# Patient Record
Sex: MALE | Race: BLACK OR AFRICAN AMERICAN | NOT HISPANIC OR LATINO | Employment: FULL TIME | URBAN - METROPOLITAN AREA
[De-identification: names, ages, dates, MRNs, and addresses within clinical notes are randomized per-mention and may not be internally consistent; named-entity substitution may affect disease eponyms.]

---

## 2023-03-15 ENCOUNTER — OFFICE VISIT (OUTPATIENT)
Dept: PODIATRY | Facility: CLINIC | Age: 28
End: 2023-03-15
Payer: COMMERCIAL

## 2023-03-15 VITALS
RESPIRATION RATE: 16 BRPM | BODY MASS INDEX: 26.48 KG/M2 | DIASTOLIC BLOOD PRESSURE: 67 MMHG | WEIGHT: 185 LBS | HEIGHT: 70 IN | SYSTOLIC BLOOD PRESSURE: 123 MMHG | HEART RATE: 67 BPM

## 2023-03-15 DIAGNOSIS — M20.5X2 ACQUIRED ADDUCTOVARUS ROTATION OF TOE OF LEFT FOOT: ICD-10-CM

## 2023-03-15 DIAGNOSIS — L97.521 SKIN ULCER OF TOE OF LEFT FOOT, LIMITED TO BREAKDOWN OF SKIN: Primary | ICD-10-CM

## 2023-03-15 DIAGNOSIS — B35.3 TINEA PEDIS OF LEFT FOOT: ICD-10-CM

## 2023-03-15 PROCEDURE — 3078F PR MOST RECENT DIASTOLIC BLOOD PRESSURE < 80 MM HG: ICD-10-PCS | Mod: CPTII,S$GLB,, | Performed by: PODIATRIST

## 2023-03-15 PROCEDURE — 99202 PR OFFICE/OUTPT VISIT, NEW, LEVL II, 15-29 MIN: ICD-10-PCS | Mod: S$GLB,,, | Performed by: PODIATRIST

## 2023-03-15 PROCEDURE — 1159F PR MEDICATION LIST DOCUMENTED IN MEDICAL RECORD: ICD-10-PCS | Mod: CPTII,S$GLB,, | Performed by: PODIATRIST

## 2023-03-15 PROCEDURE — 99999 PR PBB SHADOW E&M-NEW PATIENT-LVL III: CPT | Mod: PBBFAC,,, | Performed by: PODIATRIST

## 2023-03-15 PROCEDURE — 1160F RVW MEDS BY RX/DR IN RCRD: CPT | Mod: CPTII,S$GLB,, | Performed by: PODIATRIST

## 2023-03-15 PROCEDURE — 3008F PR BODY MASS INDEX (BMI) DOCUMENTED: ICD-10-PCS | Mod: CPTII,S$GLB,, | Performed by: PODIATRIST

## 2023-03-15 PROCEDURE — 1160F PR REVIEW ALL MEDS BY PRESCRIBER/CLIN PHARMACIST DOCUMENTED: ICD-10-PCS | Mod: CPTII,S$GLB,, | Performed by: PODIATRIST

## 2023-03-15 PROCEDURE — 3008F BODY MASS INDEX DOCD: CPT | Mod: CPTII,S$GLB,, | Performed by: PODIATRIST

## 2023-03-15 PROCEDURE — 3078F DIAST BP <80 MM HG: CPT | Mod: CPTII,S$GLB,, | Performed by: PODIATRIST

## 2023-03-15 PROCEDURE — 99999 PR PBB SHADOW E&M-NEW PATIENT-LVL III: ICD-10-PCS | Mod: PBBFAC,,, | Performed by: PODIATRIST

## 2023-03-15 PROCEDURE — 3074F SYST BP LT 130 MM HG: CPT | Mod: CPTII,S$GLB,, | Performed by: PODIATRIST

## 2023-03-15 PROCEDURE — 1159F MED LIST DOCD IN RCRD: CPT | Mod: CPTII,S$GLB,, | Performed by: PODIATRIST

## 2023-03-15 PROCEDURE — 99202 OFFICE O/P NEW SF 15 MIN: CPT | Mod: S$GLB,,, | Performed by: PODIATRIST

## 2023-03-15 PROCEDURE — 3074F PR MOST RECENT SYSTOLIC BLOOD PRESSURE < 130 MM HG: ICD-10-PCS | Mod: CPTII,S$GLB,, | Performed by: PODIATRIST

## 2023-03-15 RX ORDER — TERBINAFINE HYDROCHLORIDE 250 MG/1
250 TABLET ORAL DAILY
Qty: 14 TABLET | Refills: 1 | Status: SHIPPED | OUTPATIENT
Start: 2023-03-15 | End: 2023-03-29

## 2023-03-17 NOTE — PROGRESS NOTES
"Subjective:       Patient ID: Luis Reid is a 27 y.o. male.    Chief Complaint: Wound Check  Patient presents with family member with concern regarding "a hole in my pinky toe."  Patient relates he has had a problem with sore moist skin between the 4th and 5th toes of the left foot for 2-3 years.  He has a old prescription of clotrimazole cream he has been applying and had 3 tablets left of terbinafine from an old prescription as well.  He recently took those 3 tablets once a day and relates it has resolved the swelling and discoloration of the skin.  He felt the area was becoming infected and it is very painful at times.  Pain level 5/10    History reviewed. No pertinent past medical history.  History reviewed. No pertinent surgical history.  Family History   Problem Relation Age of Onset    Diabetes Father     Diabetes Paternal Uncle     Diabetes Maternal Grandmother      Social History     Socioeconomic History    Marital status: Single   Tobacco Use    Smoking status: Some Days     Types: Cigarettes    Smokeless tobacco: Never   Substance and Sexual Activity    Alcohol use: Not Currently    Drug use: Yes     Types: Marijuana    Sexual activity: Yes     Partners: Female       Current Outpatient Medications   Medication Sig Dispense Refill    terbinafine HCL (LAMISIL) 250 mg tablet Take 1 tablet (250 mg total) by mouth once daily. for 14 days 14 tablet 1     No current facility-administered medications for this visit.     Review of patient's allergies indicates:   Allergen Reactions    Penicillins Other (See Comments)     Patient does not know reactions.          Review of Systems   HENT:  Negative for congestion.    Respiratory:  Negative for cough.    All other systems reviewed and are negative.    Objective:      Vitals:    03/15/23 1152   BP: 123/67   Pulse: 67   Resp: 16   Weight: 83.9 kg (185 lb)   Height: 5' 10" (1.778 m)     Physical Exam  Vitals and nursing note reviewed. Exam conducted with a " chaperone present.   Constitutional:       Appearance: Normal appearance. He is normal weight.   Cardiovascular:      Pulses:           Dorsalis pedis pulses are 2+ on the right side and 2+ on the left side.        Posterior tibial pulses are 2+ on the right side and 2+ on the left side.   Pulmonary:      Effort: Pulmonary effort is normal.   Musculoskeletal:      Right foot: Deformity present.      Left foot: Deformity (adductovarus rotation 5th digit b/l) present.   Feet:      Right foot:      Skin integrity: No skin breakdown.      Left foot:      Skin integrity: Ulcer (macerated ulcer deep 4th web space secondary to chronic interdigital tinea, no evidence of infection at this time) and skin breakdown present. No erythema or warmth.   Skin:     Capillary Refill: Capillary refill takes less than 2 seconds.   Neurological:      General: No focal deficit present.      Mental Status: He is alert.   Psychiatric:         Mood and Affect: Mood normal.         Behavior: Behavior normal.         Thought Content: Thought content normal.         Judgment: Judgment normal.                     Assessment:       1. Skin ulcer of toe of left foot, limited to breakdown of skin    2. Tinea pedis of left foot    3. Acquired adductovarus rotation of toe of left foot          Plan:         TERBINAFINE 250 MG 1 DAILY TIMES 14 DAYS    Reviewed with patient ulcer developing due to chronic moisture from athlete's foot between the toes.  Advised patient this puts him at high risk for infection.  Instructed patient to discontinue clotrimazole cream, no cream, ointment or antibiotic ointment.  Applied iodine/Betadine today, dispensed samples and instructed patient to apply 3 times daily using gauze, cotton or any soft material which is comfortable to provide space between the 4th and 5th toes.  This should be on at all times throughout the day as long as he is in shoes  When home apply iodine a few times, keep the area air it out, dry as  possible  Refill terbinafine 1 daily times 14 days, discuss this medication and why it can be helpful in resolving athlete's foot between the toes.  Advised this is a very difficult spot to treat due to rubbing between the 2 toes in the moisture which develops  Discussed acquired deformity of the 5th digit, rotated causing pressure which contributes.  Advised patient even once this area is resolved would recommend baby powder or antifungal powder to keep the area dry and reduce friction and long term utilizing a soft pad between the two 2 toes to prevent recurrence  Also discussed soaking regimens which can be effective once it is completely healed to prevent recurrence  Monitor for signs of infection, contact the office with any changes prior to follow-up in 2 weeks  Patient was in understanding and agreement with treatment plan.  I counseled the patient on their conditions, implications and medical management.  Instructed patient/family to contact the office with any changes, questions, concerns, worsening of symptoms.   Total face to face time 20 minutes, exam, assessment, treatment, discussion, additional time for review of chart prior to and following appointment and visit documentation, consultation and coordination of care.   Follow up 2 weeks    This note was created using M*Modal voice recognition software that occasionally misinterpreted phrases or words.

## 2024-05-09 DIAGNOSIS — Z84.81 FAMILY HISTORY OF GENETIC DISEASE CARRIER: Primary | ICD-10-CM
